# Patient Record
Sex: MALE | Race: WHITE
[De-identification: names, ages, dates, MRNs, and addresses within clinical notes are randomized per-mention and may not be internally consistent; named-entity substitution may affect disease eponyms.]

---

## 2020-02-17 ENCOUNTER — HOSPITAL ENCOUNTER (OUTPATIENT)
Dept: HOSPITAL 39 - MRI | Age: 80
End: 2020-02-17
Payer: MEDICARE

## 2020-02-17 DIAGNOSIS — M76.72: ICD-10-CM

## 2020-02-17 DIAGNOSIS — S93.432S: Primary | ICD-10-CM

## 2020-02-17 DIAGNOSIS — S93.422S: ICD-10-CM

## 2020-02-17 DIAGNOSIS — M19.072: ICD-10-CM

## 2020-02-17 NOTE — MRI
Study: MRI of the Left Foot. MRI of the Left Ankle. 



Indication: ANKLE PAIN



Technique: Multiplanar, multi sequence MRI of the left foot and

MRI of the left ankle were obtained without intravenous contrast.





Comparison: None.



Findings:



Prior full-thickness tearing anterior talofibular ligament,

calcaneofibular ligament, and deep deltoid ligament. Pronounced

ossification at the expected location anterior talofibular

ligament measuring up to 25 mm. Several smaller ossifications

inferior to the medial malleolus. Prior sprains of the

tibiofibular ligaments as well as of the spring ligament.



Valgus angulation of the hindfoot. Severe tibiotalar joint

osteoarthritis with complete grade 4 chondral loss and cortical

remodeling of the lateral two thirds of the joint. Severe

osteoarthritis also noted of the medial margin of the posterior

subtalar joint with extensive subchondral cystic change of both

articular surfaces as well as extensive medial joint line

ganglion formation measuring up to 35 mm and extending into the

tarsal tunnel.



Pseudarthrosis between the inferior margin lateral malleolus and

the adjacent superolateral margin of the calcaneus with

subcortical cystic change. Associated lateral hindfoot

impingement suspected high-grade tendinosis and longitudinal

split tearing of the peroneus brevis and longus tendons adjacent

to this site. No rupture.



Trace retrocalcaneal bursal fluid. Low-grade Achilles tendinosis.

Anterior tendons intact. Tenosynovitis medial tendons with

insertional posterior tibialis tendinosis.



No acute fracture or coalition. Multifocal ossification along the

posterior tibiotalar joint. No rupture of the plantar fascia.

Mild talonavicular joint osteoarthritis.



Impression:



Sequela of previous inversion injuries with full-thickness

tearing anterior talofibular ligament and deep deltoid ligament

as well as multiple prior ankle ligament sprains as above.



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

joint as well as the medial margins of the posterior subtalar

joint. Associated valgus angulation of the hindfoot noted with

findings which can be seen with lateral-impingement.



High-grade tendinosis and longitudinal split tearing of the

peroneal tendons.



Additional findings as detailed above.



Electronically signed by:  Ron Baird MD  2/17/2020 2:41 PM CST

Workstation: [id number]